# Patient Record
Sex: MALE | Race: WHITE | NOT HISPANIC OR LATINO | Employment: OTHER | URBAN - METROPOLITAN AREA
[De-identification: names, ages, dates, MRNs, and addresses within clinical notes are randomized per-mention and may not be internally consistent; named-entity substitution may affect disease eponyms.]

---

## 2023-03-29 LAB
ALBUMIN (G/DL) IN SER/PLAS: 4.2 G/DL (ref 3.4–5)
ANION GAP IN SER/PLAS: 12 MMOL/L (ref 10–20)
APPEARANCE, URINE: CLEAR
BILIRUBIN, URINE: NEGATIVE
BLOOD, URINE: ABNORMAL
CALCIUM (MG/DL) IN SER/PLAS: 9.4 MG/DL (ref 8.6–10.6)
CARBON DIOXIDE, TOTAL (MMOL/L) IN SER/PLAS: 29 MMOL/L (ref 21–32)
CHLORIDE (MMOL/L) IN SER/PLAS: 104 MMOL/L (ref 98–107)
COLOR, URINE: YELLOW
CREATININE (MG/DL) IN SER/PLAS: 0.87 MG/DL (ref 0.5–1.3)
GFR MALE: >90 ML/MIN/1.73M2
GLUCOSE (MG/DL) IN SER/PLAS: 93 MG/DL (ref 74–99)
GLUCOSE, URINE: NEGATIVE MG/DL
KETONES, URINE: NEGATIVE MG/DL
LEUKOCYTE ESTERASE, URINE: NEGATIVE
MUCUS, URINE: NORMAL /LPF
NITRITE, URINE: NEGATIVE
PH, URINE: 5 (ref 5–8)
PHOSPHATE (MG/DL) IN SER/PLAS: 3.1 MG/DL (ref 2.5–4.9)
POTASSIUM (MMOL/L) IN SER/PLAS: 4.8 MMOL/L (ref 3.5–5.3)
PROTEIN, URINE: NEGATIVE MG/DL
RBC, URINE: 2 /HPF (ref 0–5)
SODIUM (MMOL/L) IN SER/PLAS: 140 MMOL/L (ref 136–145)
SPECIFIC GRAVITY, URINE: 1.02 (ref 1–1.03)
UREA NITROGEN (MG/DL) IN SER/PLAS: 13 MG/DL (ref 6–23)
UROBILINOGEN, URINE: <2 MG/DL (ref 0–1.9)
WBC, URINE: 1 /HPF (ref 0–5)

## 2023-03-30 LAB — URINE CULTURE: NO GROWTH

## 2023-04-18 ENCOUNTER — HOSPITAL ENCOUNTER (OUTPATIENT)
Dept: DATA CONVERSION | Facility: HOSPITAL | Age: 73
End: 2023-04-18
Attending: OPHTHALMOLOGY | Admitting: OPHTHALMOLOGY
Payer: MEDICARE

## 2023-04-18 DIAGNOSIS — H35.372 PUCKERING OF MACULA, LEFT EYE: ICD-10-CM

## 2023-04-18 DIAGNOSIS — Z79.01 LONG TERM (CURRENT) USE OF ANTICOAGULANTS: ICD-10-CM

## 2023-04-18 DIAGNOSIS — I10 ESSENTIAL (PRIMARY) HYPERTENSION: ICD-10-CM

## 2023-05-18 ENCOUNTER — TELEPHONE (OUTPATIENT)
Dept: FAMILY MEDICINE | Age: 73
End: 2023-05-18

## 2023-09-07 VITALS
WEIGHT: 151.24 LBS | RESPIRATION RATE: 17 BRPM | BODY MASS INDEX: 21.65 KG/M2 | HEART RATE: 64 BPM | DIASTOLIC BLOOD PRESSURE: 73 MMHG | TEMPERATURE: 97.5 F | HEIGHT: 70 IN | SYSTOLIC BLOOD PRESSURE: 165 MMHG

## 2023-09-14 NOTE — H&P
History of Present Illness:   History Present Illness:  Reason for surgery: Epiretinal membrane left eye   HPI:    Patient presents for planned PPV/ILM/ICG for ERM, left eye    Allergies:        Allergies:  ·  No Known Allergies :     Home Medication Review:   Home Medications Reviewed: yes     Impression/Procedure:   ·  Impression and Planned Procedure: Patient presents for planned PPV/ILM/ICG for ERM, left eye       ERAS (Enhanced Recovery After Surgery):  ·  ERAS Patient: no       Vital Signs:  Temperature C: 36.4 degrees C   Temperature F: 97.5 degrees F   Heart Rate: 64 beats per minute   Respiratory Rate: 17 breath per minute   Blood Pressure Systolic: 165 mm/Hg   Blood Pressure Diastolic: 73 mm/Hg     Physical Exam by System:    Constitutional: Well appearing, no acute distress   Head/Neck: Normocephalic atraumatic   Respiratory/Thorax: No respiratory distress, equal  chest rise   Cardiovascular: Well perfused, regular rate   Gastrointestinal: Non distended   Neurological: Alert, oriented, interactive   Psychological: Normal affect   Skin: No gross rash or skin breakdown     Consent:   COVID-19 Consent:  ·  COVID-19 Risk Consent Surgeon has reviewed key risks related to the risk of berenice COVID-19 and if they contract COVID-19 what the risks are.     Attestation:   Note Completion:  I am a:  Resident/Fellow   Attending Attestation I saw and evaluated the patient.  I personally obtained the key and critical portions of the history and physical exam or was physically present for key and  critical portions performed by the resident/fellow. I reviewed the resident/fellow?s documentation and discussed the patient with the resident/fellow.  I agree with the resident/fellow?s medical decision making as documented in the note.     I personally evaluated the patient on 18-Apr-2023         Electronic Signatures:  Edmund Walker (Resident))  (Signed 18-Apr-2023 11:31)   Authored: History of Present Illness,  Allergies, Home  Medication Review, Impression/Procedure, ERAS, Physical Exam, Consent, Note Completion  Porter Conklin)  (Signed 18-Apr-2023 11:46)   Authored: Note Completion   Co-Signer: History of Present Illness, Allergies, Home Medication Review, Impression/Procedure, ERAS, Physical Exam, Consent, Note Completion      Last Updated: 18-Apr-2023 11:46 by Porter Conklin)

## 2023-10-02 NOTE — OP NOTE
Post Operative Note:     PreOp Diagnosis: ERM left eye   Post-Procedure Diagnosis: ERM left eye   Procedure: 1. Vitrectomy left eye  2. Membrane peel left eye   Surgeon: Porter Conklin   Resident/Fellow/Other Assistant: Perry Vital   Estimated Blood Loss (mL): none   Specimen: no   Findings: ERM left eye       Electronic Signatures:  Perry Vital)  (Signed 18-Apr-2023 14:23)   Authored: Post Operative Note, Note Completion      Last Updated: 18-Apr-2023 14:23 by Perry Vital)

## 2023-10-26 ENCOUNTER — OFFICE VISIT (OUTPATIENT)
Dept: PRIMARY CARE | Facility: CLINIC | Age: 73
End: 2023-10-26
Payer: MEDICARE

## 2023-10-26 VITALS — WEIGHT: 154 LBS | DIASTOLIC BLOOD PRESSURE: 78 MMHG | BODY MASS INDEX: 22.1 KG/M2 | SYSTOLIC BLOOD PRESSURE: 145 MMHG

## 2023-10-26 DIAGNOSIS — M79.18 ABDOMINAL MUSCLE PAIN: ICD-10-CM

## 2023-10-26 DIAGNOSIS — M17.10 PRIMARY OSTEOARTHRITIS OF KNEE, UNSPECIFIED LATERALITY: ICD-10-CM

## 2023-10-26 DIAGNOSIS — J30.89 ALLERGIC RHINITIS DUE TO OTHER ALLERGIC TRIGGER, UNSPECIFIED SEASONALITY: ICD-10-CM

## 2023-10-26 DIAGNOSIS — I10 PRIMARY HYPERTENSION: Primary | ICD-10-CM

## 2023-10-26 DIAGNOSIS — D68.9 CLOTTING DISORDER (MULTI): ICD-10-CM

## 2023-10-26 PROCEDURE — 99214 OFFICE O/P EST MOD 30 MIN: CPT | Performed by: INTERNAL MEDICINE

## 2023-10-26 PROCEDURE — 1126F AMNT PAIN NOTED NONE PRSNT: CPT | Performed by: INTERNAL MEDICINE

## 2023-10-26 PROCEDURE — 3077F SYST BP >= 140 MM HG: CPT | Performed by: INTERNAL MEDICINE

## 2023-10-26 PROCEDURE — 3078F DIAST BP <80 MM HG: CPT | Performed by: INTERNAL MEDICINE

## 2023-10-26 NOTE — PROGRESS NOTES
Subjective   Patient ID: Lele Hill is a 72 y.o. male who presents for No chief complaint on file..    HPI met patient for first time see updated front sheet no chest pain no shortness of breath constant running nose has briefly tried Flonase and antihistamine has seen ENT not been allergy tested complains of bilateral knee pain right greater than left had been suggested total knee replacement in the past he is trying to hold out by exercising and playing pickle ball he has factor V Leiden and prothrombin deficiency is on lifelong warfarin Hx himself daily complains of whether he has a hernia or not in the ventral area longstanding blood pressure treated with lisinopril bowels normal no dysuria other bones muscles joints okay    Past medical history hypertension DJD knee factor V prothrombin status post DVT x2    Medications warfarin lisinopril 40 mg    Allergies noted and unchanged    Social history no tobacco rare alcohol    Family history factor V    Prevention some exercise some prior blood work reviewed     Pression screen not depressed    vital signs noted alert and oriented x3 NCAT no coryza nares clear discharge OP benign TM normal bilateral EAC clear bilateral bilateral    Review of Systems    Objective   There were no vitals taken for this visit.    Physical Exam  Wearing aids no AC nodes no JVD chest clear to auscultation no wheezing no crackles CV regular rate and rhythm S1-S2 without murmur gallop or rub abdomen soft nontender normal active bowel sounds there is no ventral hernia has some oblique area discomfort with motion LS spine normal curvature nontender spinous process negative straight leg raise extremities no clubbing cyanosis or edema normal distal pulses DTR 2+  Assessment/Plan          impression hypertension allergic rhinitis or line and allergic rhinitis oblique strain clotting disorder knee DJD  Plan continue to check home monitoring advised on Coumadin in diet good water consumption  Tylenol as needed for the knee range of motion therapy knee brace offloading which she is using we will recheck in January 2024 for regular physical examination and blood work okay for Claritin trial 10 mg/day x 2 months he had only tried this for a week before may also consider Atrovent or Astepro in the future may follow-up with orthopedics as needed no heavy bending lifting or straining with respect to the abdominal area liniment cream if needed or heating pad recheck as above TT 50 cc 26

## 2023-10-28 ASSESSMENT — ENCOUNTER SYMPTOMS
LOSS OF SENSATION IN FEET: 0
DEPRESSION: 0
OCCASIONAL FEELINGS OF UNSTEADINESS: 0

## 2023-11-20 ENCOUNTER — APPOINTMENT (OUTPATIENT)
Dept: DERMATOLOGY | Facility: HOSPITAL | Age: 73
End: 2023-11-20
Payer: MEDICARE

## 2024-01-10 ENCOUNTER — OFFICE VISIT (OUTPATIENT)
Dept: PRIMARY CARE | Facility: CLINIC | Age: 74
End: 2024-01-10
Payer: MEDICARE

## 2024-01-10 VITALS — DIASTOLIC BLOOD PRESSURE: 73 MMHG | BODY MASS INDEX: 21.81 KG/M2 | SYSTOLIC BLOOD PRESSURE: 121 MMHG | WEIGHT: 152 LBS

## 2024-01-10 DIAGNOSIS — N40.0 BENIGN PROSTATIC HYPERPLASIA, UNSPECIFIED WHETHER LOWER URINARY TRACT SYMPTOMS PRESENT: ICD-10-CM

## 2024-01-10 DIAGNOSIS — I10 PRIMARY HYPERTENSION: ICD-10-CM

## 2024-01-10 DIAGNOSIS — Z00.00 HEALTH CARE MAINTENANCE: Primary | ICD-10-CM

## 2024-01-10 DIAGNOSIS — D68.9 CLOTTING DISORDER (MULTI): ICD-10-CM

## 2024-01-10 LAB
ALBUMIN SERPL BCP-MCNC: 4.1 G/DL (ref 3.4–5)
ALP SERPL-CCNC: 36 U/L (ref 33–136)
ALT SERPL W P-5'-P-CCNC: 12 U/L (ref 10–52)
ANION GAP SERPL CALC-SCNC: 12 MMOL/L (ref 10–20)
AST SERPL W P-5'-P-CCNC: 20 U/L (ref 9–39)
BILIRUB SERPL-MCNC: 1.2 MG/DL (ref 0–1.2)
BUN SERPL-MCNC: 11 MG/DL (ref 6–23)
CALCIUM SERPL-MCNC: 9.7 MG/DL (ref 8.6–10.6)
CHLORIDE SERPL-SCNC: 104 MMOL/L (ref 98–107)
CHOLEST SERPL-MCNC: 166 MG/DL (ref 0–199)
CHOLESTEROL/HDL RATIO: 2.7
CO2 SERPL-SCNC: 30 MMOL/L (ref 21–32)
CREAT SERPL-MCNC: 0.86 MG/DL (ref 0.5–1.3)
EGFRCR SERPLBLD CKD-EPI 2021: >90 ML/MIN/1.73M*2
ERYTHROCYTE [DISTWIDTH] IN BLOOD BY AUTOMATED COUNT: 12.8 % (ref 11.5–14.5)
GLUCOSE SERPL-MCNC: 88 MG/DL (ref 74–99)
HCT VFR BLD AUTO: 40.4 % (ref 41–52)
HDLC SERPL-MCNC: 61.7 MG/DL
HGB BLD-MCNC: 13 G/DL (ref 13.5–17.5)
LDLC SERPL CALC-MCNC: 86 MG/DL
MCH RBC QN AUTO: 29.1 PG (ref 26–34)
MCHC RBC AUTO-ENTMCNC: 32.2 G/DL (ref 32–36)
MCV RBC AUTO: 91 FL (ref 80–100)
NON HDL CHOLESTEROL: 104 MG/DL (ref 0–149)
NRBC BLD-RTO: 0 /100 WBCS (ref 0–0)
PLATELET # BLD AUTO: 197 X10*3/UL (ref 150–450)
POTASSIUM SERPL-SCNC: 4.4 MMOL/L (ref 3.5–5.3)
PROT SERPL-MCNC: 7.2 G/DL (ref 6.4–8.2)
PSA SERPL-MCNC: 1.18 NG/ML
RBC # BLD AUTO: 4.46 X10*6/UL (ref 4.5–5.9)
SODIUM SERPL-SCNC: 142 MMOL/L (ref 136–145)
TRIGL SERPL-MCNC: 92 MG/DL (ref 0–149)
VLDL: 18 MG/DL (ref 0–40)
WBC # BLD AUTO: 6.6 X10*3/UL (ref 4.4–11.3)

## 2024-01-10 PROCEDURE — 1159F MED LIST DOCD IN RCRD: CPT | Performed by: INTERNAL MEDICINE

## 2024-01-10 PROCEDURE — 1160F RVW MEDS BY RX/DR IN RCRD: CPT | Performed by: INTERNAL MEDICINE

## 2024-01-10 PROCEDURE — 84153 ASSAY OF PSA TOTAL: CPT

## 2024-01-10 PROCEDURE — 80053 COMPREHEN METABOLIC PANEL: CPT

## 2024-01-10 PROCEDURE — 1157F ADVNC CARE PLAN IN RCRD: CPT | Performed by: INTERNAL MEDICINE

## 2024-01-10 PROCEDURE — 1036F TOBACCO NON-USER: CPT | Performed by: INTERNAL MEDICINE

## 2024-01-10 PROCEDURE — 99213 OFFICE O/P EST LOW 20 MIN: CPT | Performed by: INTERNAL MEDICINE

## 2024-01-10 PROCEDURE — 3078F DIAST BP <80 MM HG: CPT | Performed by: INTERNAL MEDICINE

## 2024-01-10 PROCEDURE — G0439 PPPS, SUBSEQ VISIT: HCPCS | Performed by: INTERNAL MEDICINE

## 2024-01-10 PROCEDURE — 99397 PER PM REEVAL EST PAT 65+ YR: CPT | Performed by: INTERNAL MEDICINE

## 2024-01-10 PROCEDURE — 3074F SYST BP LT 130 MM HG: CPT | Performed by: INTERNAL MEDICINE

## 2024-01-10 PROCEDURE — 85027 COMPLETE CBC AUTOMATED: CPT

## 2024-01-10 PROCEDURE — 80061 LIPID PANEL: CPT

## 2024-01-10 PROCEDURE — 1170F FXNL STATUS ASSESSED: CPT | Performed by: INTERNAL MEDICINE

## 2024-01-10 PROCEDURE — 1126F AMNT PAIN NOTED NONE PRSNT: CPT | Performed by: INTERNAL MEDICINE

## 2024-01-10 PROCEDURE — 36415 COLL VENOUS BLD VENIPUNCTURE: CPT

## 2024-01-10 PROCEDURE — 93000 ELECTROCARDIOGRAM COMPLETE: CPT | Performed by: INTERNAL MEDICINE

## 2024-01-10 NOTE — PROGRESS NOTES
a[facility] Subjective   Patient ID: Lele Hill is a 73 y.o. male who presents for No chief complaint on file..    HPI CPE see updated front sheet no chest pain no shortness of breath no side effect with medication occasional low back stiffness bowels okay no dysuria other bones muscles joints okay    Past medical history hypertension allergic rhinitis BPH clotting disorder factor V melanoma status post DVT    Medications noted and unchanged including warfarin    Allergies noted and unchanged no known drug allergies    Social history no tobacco    Family history noted and unchanged    Prevention discussed with colonoscopy may or Cologuard some exercise some prior blood work reviewed    Depression screen not depressed    Review of Systems    Objective   There were no vitals taken for this visit.    Physical Exam vital signs noted alert and oriented x 3 NCAT PERRLA EOMI nares without discharge OP benign TM normal bilateral EAC clear bilateral no AC nodes no JVD or bruit no thyromegaly chest clear to auscultation CV regular rate and rhythm S1-S2 abdomen soft nontender normal active bowel sounds no rebound or guarding no HSM LS spine normal to straighten and curvature negative straight leg raise negative logroll negative SI joint tenderness extremities no clubbing cyanosis or edema trace nonpitting no cord normal distal pulses DTR 2+    Assessment/Plan impression General medical examination hypertension other diagnoses BPH clotting oa/back  Plan follow-up with dermatology follow-up in Coumadin clinic continue with the warfarin on the basis of perhaps forming clots check EKG advised on heart advised on blood pressure blood pressure medicine check Chem-7 advised on glucose potassium and kidney function back hygiene back stretches tylenol if anything else is needed   check hepatic panel advised on liver enzymes check lipid panel advised on cholesterol cholesterol medicine check CBC advised on blood count and colonoscopy  and follow-up on if having had or Cologuard continue with Cialis if helpful for the daily prostate recheck 3 months based on above TT 60 cc 31

## 2024-01-28 ASSESSMENT — ENCOUNTER SYMPTOMS
DEPRESSION: 0
OCCASIONAL FEELINGS OF UNSTEADINESS: 0
LOSS OF SENSATION IN FEET: 0

## 2024-01-28 ASSESSMENT — ACTIVITIES OF DAILY LIVING (ADL)
MANAGING_FINANCES: INDEPENDENT
DOING_HOUSEWORK: INDEPENDENT
TAKING_MEDICATION: INDEPENDENT
DRESSING: INDEPENDENT
GROCERY_SHOPPING: INDEPENDENT
BATHING: INDEPENDENT

## 2024-01-28 ASSESSMENT — PATIENT HEALTH QUESTIONNAIRE - PHQ9
2. FEELING DOWN, DEPRESSED OR HOPELESS: NOT AT ALL
SUM OF ALL RESPONSES TO PHQ9 QUESTIONS 1 AND 2: 0
1. LITTLE INTEREST OR PLEASURE IN DOING THINGS: NOT AT ALL

## 2024-02-09 ENCOUNTER — APPOINTMENT (OUTPATIENT)
Dept: UROLOGY | Facility: HOSPITAL | Age: 74
End: 2024-02-09
Payer: MEDICARE

## 2024-03-06 DIAGNOSIS — I10 PRIMARY HYPERTENSION: Primary | ICD-10-CM

## 2024-03-06 RX ORDER — LISINOPRIL 40 MG/1
40 TABLET ORAL DAILY
Qty: 90 TABLET | Refills: 0 | Status: SHIPPED | OUTPATIENT
Start: 2024-03-06

## 2024-03-25 ENCOUNTER — APPOINTMENT (OUTPATIENT)
Dept: DERMATOLOGY | Facility: HOSPITAL | Age: 74
End: 2024-03-25
Payer: MEDICARE

## 2024-04-19 NOTE — OP NOTE
DATE OF SURGERY: 4/18/23    PREOPERATIVE DIAGNOSIS  Epiretinal membrane left eye    POSTOPERATIVE DIAGNOSIS   Epiretinal membrane left eye    SURGEON: Porter Conklin MD    ASSISTANT: Perry Vital MD - a qualified  resident was not available, and the use of an assistant was necessary for crucial portions of the procedure, including the vitrectomy and membrane peeling portions of the procedure    OPERATION PERFORMED  25-gauge pars plana vitrectomy, membrane peel, left eye    ANESTHESIA  General     FINDINGS  As detailed below     COMPLICATIONS  None     ESTIMATED BLOOD LOSS   Minimal     SPECIMENS REMOVED  None     PROCEDURE:   The patient was brought to the preoperative holding area where the correct eye was confirmed and marked.  The patient was then brought to the operating room where a secondary time-out was performed to identify the correct patient, eye, procedure, and  any allergies. General anesthesia was induced. The eye was prepped and draped in the usual sterile ophthalmic fashion followed by placement of a lid speculum.     A 25-gauge trocar was placed in the inferotemporal quadrant 3.5 mm posterior to the limbus after conjunctival displacement.  A 4 mm infusion cannula was placed through this trocar, and the infusion cannula was confirmed in the vitreous cavity prior to  turning it on. Two additional 25-gauge trocars were placed in a similar fashion in the superonasal and superotemporal quadrants 3.5 mm posterior to the limbus after conjunctival displacement.     At this time, a standard three-port pars plana vitrectomy was performed using the light pipe, the cutter, and the BIOM viewing system. A core vitreous  dissection was performed. The retina was inspected and was found to be attached. A posterior vitreous detachment was performed with aspiration of  the hyaloid over the optic nerve after injection of diluted intravitreal triamcinolone.  A thorough peripheral vitreous dissection was performed.  The  ILM and ERM were peeled off the surface  of the macula in the posterior pole with ILM forceps, after injection of diluted Kenalog and  ICG for improved visualization.    The peripheral retina was inspected with scleral depression and was found to be attached without any retinal holes, breaks or fluid. The superonasal  and superotemporal trocars were removed and were found to be watertight. The infusion cannula and associated trocar were then removed and found to  be watertight. The eye was confirmed to be at a physiologic level by digital palpation after removal of the trocars.     The lid speculum and drapes were removed. Antibiotic ointment was applied. The eye was patched and shielded. The patient tolerated the procedure well without any intraoperative or immediate postoperative complications. The patient was taken to the recovery  room in good condition. The patient will follow up in clinic on the following morning.       Electronic Signatures:  Porter Conklin) (Signed on 26-Apr-2023 16:34)   Co-Signer  Perry Vital) (Signed on 18-Apr-2023 14:27)   Authored    Last Updated: 26-Apr-2023 16:34 by Porter Conklin)

## 2024-04-23 DIAGNOSIS — F41.9 ANXIETY: Primary | ICD-10-CM

## 2024-04-23 RX ORDER — FLUOXETINE HYDROCHLORIDE 20 MG/1
CAPSULE ORAL
COMMUNITY
Start: 2017-11-01 | End: 2024-04-23 | Stop reason: SDUPTHER

## 2024-04-23 RX ORDER — FLUOXETINE HYDROCHLORIDE 20 MG/1
20 CAPSULE ORAL DAILY
Qty: 30 CAPSULE | Refills: 1 | Status: SHIPPED | OUTPATIENT
Start: 2024-04-23

## 2024-04-29 ENCOUNTER — APPOINTMENT (OUTPATIENT)
Dept: DERMATOLOGY | Facility: HOSPITAL | Age: 74
End: 2024-04-29
Payer: MEDICARE

## 2024-05-22 ENCOUNTER — OFFICE VISIT (OUTPATIENT)
Dept: DERMATOLOGY | Facility: CLINIC | Age: 74
End: 2024-05-22
Payer: MEDICARE

## 2024-05-22 DIAGNOSIS — D22.9 MELANOCYTIC NEVUS, UNSPECIFIED LOCATION: ICD-10-CM

## 2024-05-22 DIAGNOSIS — L82.1 SEBORRHEIC KERATOSIS: ICD-10-CM

## 2024-05-22 DIAGNOSIS — D18.01 HEMANGIOMA OF SKIN: ICD-10-CM

## 2024-05-22 DIAGNOSIS — L57.8 OTHER SKIN CHANGES DUE TO CHRONIC EXPOSURE TO NONIONIZING RADIATION: Primary | ICD-10-CM

## 2024-05-22 DIAGNOSIS — Z85.828 PERSONAL HISTORY OF SKIN CANCER: ICD-10-CM

## 2024-05-22 DIAGNOSIS — L57.0 ACTINIC KERATOSES: ICD-10-CM

## 2024-05-22 DIAGNOSIS — L81.4 LENTIGO: ICD-10-CM

## 2024-05-22 DIAGNOSIS — Z85.820 PERSONAL HISTORY OF MALIGNANT MELANOMA OF SKIN: ICD-10-CM

## 2024-05-22 PROCEDURE — 17000 DESTRUCT PREMALG LESION: CPT | Performed by: STUDENT IN AN ORGANIZED HEALTH CARE EDUCATION/TRAINING PROGRAM

## 2024-05-22 PROCEDURE — 99213 OFFICE O/P EST LOW 20 MIN: CPT | Performed by: STUDENT IN AN ORGANIZED HEALTH CARE EDUCATION/TRAINING PROGRAM

## 2024-05-22 PROCEDURE — 1160F RVW MEDS BY RX/DR IN RCRD: CPT | Performed by: STUDENT IN AN ORGANIZED HEALTH CARE EDUCATION/TRAINING PROGRAM

## 2024-05-22 PROCEDURE — 1157F ADVNC CARE PLAN IN RCRD: CPT | Performed by: STUDENT IN AN ORGANIZED HEALTH CARE EDUCATION/TRAINING PROGRAM

## 2024-05-22 PROCEDURE — 1159F MED LIST DOCD IN RCRD: CPT | Performed by: STUDENT IN AN ORGANIZED HEALTH CARE EDUCATION/TRAINING PROGRAM

## 2024-05-22 PROCEDURE — 17003 DESTRUCT PREMALG LES 2-14: CPT | Performed by: STUDENT IN AN ORGANIZED HEALTH CARE EDUCATION/TRAINING PROGRAM

## 2024-05-22 NOTE — PROGRESS NOTES
Subjective     Lele Hill is a 73 y.o. male who presents for the following: Skin Check (FBSE. History of skin cancer (lower left leg, Behind left ear).).     Review of Systems:  No other skin or systemic complaints other than what is documented elsewhere in the note.    The following portions of the chart were reviewed this encounter and updated as appropriate:         Skin Cancer History  Melanoma left leg 2008  BCC left ear 2017 s/p Mohs      Specialty Problems    None       Objective   Well appearing patient in no apparent distress; mood and affect are within normal limits.    A full examination was performed including scalp, head, eyes, ears, nose, lips, neck, chest, axillae, abdomen, back, buttocks, bilateral upper extremities, bilateral lower extremities, hands, feet, fingers, toes, fingernails, and toenails. All findings within normal limits unless otherwise noted below.    Assessment/Plan   1. Other skin changes due to chronic exposure to nonionizing radiation  Mottled pigmentation, telangiectasias and brown reticular macules in sun exposed areas on the face, extremities, trunk    The risk of chronic, cumulative sun damage and risk of development of skin cancer was reviewed with the patient today. Discussed important of sun protection with sun protective clothing and/or broad spectrum sunscreen spf 30 or above.  Warning signs of skin cancer were reviewed. Patient to contact office should they notice any new or changing pre-existing skin lesion.    Related Procedures  Follow Up In Dermatology - Established Patient    2. Actinic keratoses (4)  Left Forehead, Mid Forehead, Right Ear, Right Forehead  Erythematous gritty macule(s)    Lesions are due to chronic, cumulative sun damage over time and are pre-cancerous. They have a risk of developing into squamous cell carcinoma and therefore treatment recommendations were offered and discussed with the patient. Discussed LN2 & topical chemotherapy creams, risks  and benefits of each. The risks and benefits of LN2 were reviewed including incomplete removal, crusting, blister hypo and/or hyperpigmentation, scarring. The patient elected for LN2 today.    Destr of lesion - Left Forehead, Mid Forehead, Right Ear, Right Forehead  Complexity: simple    Destruction method: cryotherapy    Informed consent: discussed and consent obtained    Lesion destroyed using liquid nitrogen: Yes    Cryotherapy cycles:  1  Outcome: patient tolerated procedure well with no complications    Post-procedure details: wound care instructions given      3. Personal history of skin cancer  Scar at site of prior procedure    There is no evidence of recurrence on clinical examination today, reassurance was provided to the patient. Discussed that hx of skin cancer increases risk of developing future skin cancer and total body skin exams are warranted every 6 months    4. Melanocytic nevus, unspecified location  Benign to slightly atypical appearing brown pigmented macules and papules    Clinically benign appearing nevi, reassurance provided to the patient today. Discussed important of sun protection with sun protective clothing and/or broad spectrum sunscreen spf 30 or above.  ABCDEs of melanoma reviewed. Patient to f/u should they notice any new or changing pre-existing skin lesion.    5. Seborrheic keratosis  Stuck on verrucous, tan-brown papules and plaques.      The benign nature of these skin lesions were reviewed with the patient today and reassurance was provided. Patient advised to return for f/u if the lesions change in size, shape, color, become painful, tender, itch or bleed.    6. Hemangioma of skin  Bright red papules    Discussed benign nature of condition, reassured. Reviewed warning signs of skin cancer with patient.    7. Lentigo  Scattered tan macules in sun-exposed areas.    Benign nature of these skin lesions reviewed and relation to sun exposure discussed. Reassurance provided. Reviewed  warning signs of skin cancer.    8. Personal history of malignant melanoma of skin  Scar(s) with no evidence of recurrence.    Lymph Nodes  Lymph Nodes-Clinical Exam:       There is no evidence of recurrence on clinical examination today, reassurance was provided to the patient.

## 2024-06-19 DIAGNOSIS — I10 PRIMARY HYPERTENSION: ICD-10-CM

## 2024-06-19 RX ORDER — LISINOPRIL 40 MG/1
40 TABLET ORAL DAILY
Qty: 90 TABLET | Refills: 0 | Status: SHIPPED | OUTPATIENT
Start: 2024-06-19

## 2024-06-19 RX ORDER — WARFARIN 4 MG/1
4 TABLET ORAL
Qty: 90 TABLET | Refills: 0 | Status: SHIPPED | OUTPATIENT
Start: 2024-06-19

## 2024-09-13 ENCOUNTER — TELEPHONE (OUTPATIENT)
Dept: PRIMARY CARE | Facility: CLINIC | Age: 74
End: 2024-09-13

## 2024-09-13 ENCOUNTER — APPOINTMENT (OUTPATIENT)
Dept: OPHTHALMOLOGY | Facility: CLINIC | Age: 74
End: 2024-09-13
Payer: MEDICARE

## 2024-09-13 DIAGNOSIS — I10 PRIMARY HYPERTENSION: ICD-10-CM

## 2024-09-14 RX ORDER — LISINOPRIL 40 MG/1
40 TABLET ORAL DAILY
Qty: 90 TABLET | Refills: 0 | Status: SHIPPED | OUTPATIENT
Start: 2024-09-14 | End: 2024-09-18 | Stop reason: SDUPTHER

## 2024-09-18 ENCOUNTER — APPOINTMENT (OUTPATIENT)
Dept: OPHTHALMOLOGY | Facility: CLINIC | Age: 74
End: 2024-09-18
Payer: MEDICARE

## 2024-09-18 DIAGNOSIS — H35.352 CYSTOID MACULAR EDEMA OF LEFT EYE: ICD-10-CM

## 2024-09-18 DIAGNOSIS — H35.372 EPIRETINAL MEMBRANE (ERM) OF LEFT EYE: Primary | ICD-10-CM

## 2024-09-18 PROCEDURE — 99213 OFFICE O/P EST LOW 20 MIN: CPT | Performed by: OPHTHALMOLOGY

## 2024-09-18 PROCEDURE — 92134 CPTRZ OPH DX IMG PST SGM RTA: CPT | Performed by: OPHTHALMOLOGY

## 2024-09-18 ASSESSMENT — TONOMETRY
OD_IOP_MMHG: 14
IOP_METHOD: GOLDMANN APPLANATION
OS_IOP_MMHG: 12

## 2024-09-18 ASSESSMENT — EXTERNAL EXAM - LEFT EYE: OS_EXAM: NORMAL

## 2024-09-18 ASSESSMENT — ENCOUNTER SYMPTOMS
ALLERGIC/IMMUNOLOGIC NEGATIVE: 0
EYES NEGATIVE: 0
CONSTITUTIONAL NEGATIVE: 0
HEMATOLOGIC/LYMPHATIC NEGATIVE: 0
PSYCHIATRIC NEGATIVE: 0
MUSCULOSKELETAL NEGATIVE: 0
RESPIRATORY NEGATIVE: 0
GASTROINTESTINAL NEGATIVE: 0
NEUROLOGICAL NEGATIVE: 0
CARDIOVASCULAR NEGATIVE: 0
ENDOCRINE NEGATIVE: 0

## 2024-09-18 ASSESSMENT — CUP TO DISC RATIO
OD_RATIO: 0.7
OS_RATIO: .3

## 2024-09-18 ASSESSMENT — VISUAL ACUITY
OD_CC: 20/25
OD_CC+: -1
OS_CC: 20/25
CORRECTION_TYPE: GLASSES
METHOD: SNELLEN - LINEAR

## 2024-09-18 ASSESSMENT — CONF VISUAL FIELD
OS_INFERIOR_NASAL_RESTRICTION: 0
OS_NORMAL: 1
OD_SUPERIOR_TEMPORAL_RESTRICTION: 0
OS_SUPERIOR_TEMPORAL_RESTRICTION: 0
OD_INFERIOR_TEMPORAL_RESTRICTION: 0
OD_NORMAL: 1
OS_SUPERIOR_NASAL_RESTRICTION: 0
OS_INFERIOR_TEMPORAL_RESTRICTION: 0
OD_INFERIOR_NASAL_RESTRICTION: 0
OD_SUPERIOR_NASAL_RESTRICTION: 0

## 2024-09-18 ASSESSMENT — SLIT LAMP EXAM - LIDS: COMMENTS: GOOD POSITION

## 2024-09-18 ASSESSMENT — EXTERNAL EXAM - RIGHT EYE: OD_EXAM: NORMAL

## 2024-09-18 ASSESSMENT — PACHYMETRY
OD_CT(UM): 591
OS_CT(UM): 633

## 2024-09-19 RX ORDER — LISINOPRIL 40 MG/1
40 TABLET ORAL DAILY
Qty: 90 TABLET | Refills: 0 | Status: SHIPPED | OUTPATIENT
Start: 2024-09-19

## 2024-09-19 RX ORDER — WARFARIN 4 MG/1
TABLET ORAL
Qty: 90 TABLET | Refills: 0 | Status: SHIPPED | OUTPATIENT
Start: 2024-09-19

## 2024-11-20 ENCOUNTER — APPOINTMENT (OUTPATIENT)
Dept: DERMATOLOGY | Facility: CLINIC | Age: 74
End: 2024-11-20
Payer: MEDICARE

## 2024-11-20 DIAGNOSIS — Z85.820 PERSONAL HISTORY OF MALIGNANT MELANOMA OF SKIN: ICD-10-CM

## 2024-11-20 DIAGNOSIS — L82.1 SEBORRHEIC KERATOSIS: ICD-10-CM

## 2024-11-20 DIAGNOSIS — D22.9 MELANOCYTIC NEVUS, UNSPECIFIED LOCATION: ICD-10-CM

## 2024-11-20 DIAGNOSIS — L57.8 OTHER SKIN CHANGES DUE TO CHRONIC EXPOSURE TO NONIONIZING RADIATION: ICD-10-CM

## 2024-11-20 DIAGNOSIS — L57.0 ACTINIC KERATOSES: ICD-10-CM

## 2024-11-20 DIAGNOSIS — D18.01 HEMANGIOMA OF SKIN: ICD-10-CM

## 2024-11-20 DIAGNOSIS — Z85.828 PERSONAL HISTORY OF SKIN CANCER: ICD-10-CM

## 2024-11-20 DIAGNOSIS — D48.5 NEOPLASM OF UNCERTAIN BEHAVIOR OF SKIN: Primary | ICD-10-CM

## 2024-11-20 DIAGNOSIS — L21.9 SEBORRHEIC DERMATITIS: ICD-10-CM

## 2024-11-20 DIAGNOSIS — L81.4 LENTIGO: ICD-10-CM

## 2024-11-20 PROCEDURE — 17003 DESTRUCT PREMALG LES 2-14: CPT

## 2024-11-20 PROCEDURE — 1159F MED LIST DOCD IN RCRD: CPT | Performed by: STUDENT IN AN ORGANIZED HEALTH CARE EDUCATION/TRAINING PROGRAM

## 2024-11-20 PROCEDURE — 1157F ADVNC CARE PLAN IN RCRD: CPT | Performed by: STUDENT IN AN ORGANIZED HEALTH CARE EDUCATION/TRAINING PROGRAM

## 2024-11-20 PROCEDURE — 17000 DESTRUCT PREMALG LESION: CPT

## 2024-11-20 PROCEDURE — 99214 OFFICE O/P EST MOD 30 MIN: CPT | Performed by: STUDENT IN AN ORGANIZED HEALTH CARE EDUCATION/TRAINING PROGRAM

## 2024-11-20 PROCEDURE — 11102 TANGNTL BX SKIN SINGLE LES: CPT

## 2024-11-20 RX ORDER — HYDROCORTISONE 25 MG/G
CREAM TOPICAL NIGHTLY
Qty: 30 G | Refills: 3 | Status: SHIPPED | OUTPATIENT
Start: 2024-11-20

## 2024-11-20 RX ORDER — KETOCONAZOLE 20 MG/G
CREAM TOPICAL DAILY
Qty: 60 G | Refills: 11 | Status: SHIPPED | OUTPATIENT
Start: 2024-11-20

## 2024-11-20 RX ORDER — FLUOROURACIL 50 MG/G
CREAM TOPICAL 2 TIMES DAILY
Qty: 40 G | Refills: 1 | Status: SHIPPED | OUTPATIENT
Start: 2024-11-20

## 2024-11-20 NOTE — PROGRESS NOTES
Subjective     Lele Hill is a 74 y.o. male who presents for the following: Skin Check (6 month follow up FBSE, Hx of Melanoma left leg 2008, BCC left ear 2017 treated by Mohs surgery. /).     Review of Systems:  No other skin or systemic complaints other than what is documented elsewhere in the note.    The following portions of the chart were reviewed this encounter and updated as appropriate:         Skin Cancer History  No skin cancer on file.      Specialty Problems    None       Objective   Well appearing patient in no apparent distress; mood and affect are within normal limits.    A full examination was performed including scalp, head, eyes, ears, nose, lips, neck, chest, axillae, abdomen, back, buttocks, bilateral upper extremities, bilateral lower extremities, hands, feet, fingers, toes, fingernails, and toenails. All findings within normal limits unless otherwise noted below.    Assessment/Plan   1. Neoplasm of uncertain behavior of skin  Right Upper Back  5 x 6 mm brown macule          Lesion biopsy  Type of biopsy: tangential    Informed consent: discussed and consent obtained    Timeout: patient name, date of birth, surgical site, and procedure verified    Procedure prep:  Patient was prepped and draped  Anesthesia: the lesion was anesthetized in a standard fashion    Anesthetic:  1% lidocaine w/ epinephrine 1-100,000 local infiltration  Instrument used: DermaBlade    Hemostasis achieved with: aluminum chloride    Outcome: patient tolerated procedure well    Post-procedure details: sterile dressing applied and wound care instructions given    Dressing type: petrolatum and bandage      Staff Communication: Dermatology Local Anesthesia: 1 % Lidocaine / Epinephrine - Amount: 3 mL    Specimen 1 - Dermatopathology- DERM LAB  Differential Diagnosis: dysplastic nevus vs SK  Check Margins Yes/No?:    Comments:  hx of MIS  Dermpath Lab: Routine Histopathology (formalin-fixed tissue)    Concerning lesion  found on exam. DDX for lesion includes: dysplastic nevus vs SK. The need for biopsy to aid in diagnosis was discussed and recommended. Risks and benefits of biopsy were reviewed. See procedure note.    2. Actinic keratoses (10)  Left Forehead, Left Inferior Helix (2), Left Mid Helix, Left Posterior Neck, Mid Forehead, Right Forehead, Right Inferior Helix, Right Mid Helix, Right Superior Helix  Erythematous gritty macule(s)    Lesions are due to chronic, cumulative sun damage over time and are pre-cancerous. They have a risk of developing into squamous cell carcinoma and therefore treatment recommendations were offered and discussed with the patient. Discussed LN2 & topical chemotherapy creams, risks and benefits of each. The risks and benefits of LN2 were reviewed including incomplete removal, crusting, blister hypo and/or hyperpigmentation, scarring.   START 5-fluorouracil bid x 2 weeks to forehead and temple  The patient elected for LN2 today for other areas    Destr of lesion - Left Inferior Helix (2), Left Mid Helix, Left Posterior Neck, Right Inferior Helix, Right Mid Helix, Right Superior Helix  Complexity: simple    Destruction method: cryotherapy    Informed consent: discussed and consent obtained    Lesion destroyed using liquid nitrogen: Yes    Region frozen until ice ball extended beyond lesion: Yes    Cryotherapy cycles:  1  Outcome: patient tolerated procedure well with no complications    Post-procedure details: wound care instructions given      Related Medications  fluorouracil (Efudex) 5 % cream cream  Apply topically 2 times a day. Apply to the affected summer of the temple and forehead twice daily for a total of 2 weeks. Wash hands thoroughly after each application.    3. Other skin changes due to chronic exposure to nonionizing radiation  Mottled pigmentation, telangiectasias and brown reticular macules in sun exposed areas on the face, extremities, trunk    The risk of chronic, cumulative sun  damage and risk of development of skin cancer was reviewed with the patient today. Discussed important of sun protection with sun protective clothing and/or broad spectrum sunscreen spf 30 or above.  Warning signs of skin cancer were reviewed. Patient to contact office should they notice any new or changing pre-existing skin lesion.    Related Procedures  Follow Up In Dermatology - Established Patient    4. Personal history of malignant melanoma of skin  Scar(s) with no evidence of recurrence.    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     5. Personal history of skin cancer  Scar at site of prior procedure        There is no evidence of recurrence on clinical examination today, reassurance was provided to the patient. Discussed that hx of skin cancer increases risk of developing future skin cancer and total body skin exams are warranted every 6 months    6. Melanocytic nevus, unspecified location  Benign to slightly atypical appearing brown pigmented macules and papules    Clinically benign appearing nevi, reassurance provided to the patient today. Discussed important of sun protection with sun protective clothing and/or broad spectrum sunscreen spf 30 or above.  ABCDEs of melanoma reviewed. Patient to f/u should they notice any new or changing pre-existing skin lesion.    7. Seborrheic keratosis  Stuck on verrucous, tan-brown papules and plaques.      The benign nature of these skin lesions were reviewed with the patient today and reassurance was provided. Patient advised to return for f/u if the lesions change in size, shape, color, become painful, tender, itch or bleed.    8. Hemangioma of skin  Bright red papules    Discussed benign nature of condition, reassured. Reviewed warning signs of skin cancer with patient.    9. Lentigo  Scattered trevino  macules in sun-exposed areas.    Benign nature of these skin lesions reviewed and relation to sun exposure discussed. Reassurance provided. Reviewed warning signs of skin cancer.    10. Seborrheic dermatitis  Left Cavum  Erythema with overlying greasy scale.    Discussed the chronic and relapsing nature of the condition. Counseled on relation to normal yeast species on skin and body's immune reaction to it. Discussed that goal is control, not cure, of condition.     Start ketoconazole 2% cream daily.   Continue hydrocortisone 2.5% cream to ear. Discussed r/b of chronic steroid use including skin atrophy      ketoconazole (NIZOral) 2 % cream - Left Cavum  Apply topically once daily. Apply to ear nightly    hydrocortisone 2.5 % cream - Left Cavum  Apply topically once daily at bedtime.      RTC 6 months    Patient seen and discussed with Dr. Jorge,   Mi Carias MD MPH  PGY-2, Department of Dermatology      I saw and evaluated the patient. I personally obtained the key and critical portions of the history and physical exam or was physically present for key and critical portions performed by the resident. I reviewed the resident's documentation and discussed the patient with the resident. I agree with the resident's medical decision making as documented in the note.    Zelda Jorge MD

## 2024-11-22 LAB
LABORATORY COMMENT REPORT: NORMAL
PATH REPORT.FINAL DX SPEC: NORMAL
PATH REPORT.GROSS SPEC: NORMAL
PATH REPORT.MICROSCOPIC SPEC OTHER STN: NORMAL
PATH REPORT.RELEVANT HX SPEC: NORMAL
PATH REPORT.TOTAL CANCER: NORMAL

## 2024-11-26 NOTE — RESULT ENCOUNTER NOTE
"Patient saw Right Upper Back Shave Biopsy Results and \"Patient Communication\" on 11/22/2024 at 9:46 PM. Dr. Jorge noted the result as a benign Seborrheic Keratosis requiring no further treatment."

## 2024-12-12 DIAGNOSIS — I10 PRIMARY HYPERTENSION: ICD-10-CM

## 2024-12-13 RX ORDER — LISINOPRIL 40 MG/1
40 TABLET ORAL DAILY
Qty: 90 TABLET | Refills: 0 | Status: SHIPPED | OUTPATIENT
Start: 2024-12-13

## 2024-12-16 DIAGNOSIS — I10 PRIMARY HYPERTENSION: ICD-10-CM

## 2024-12-20 RX ORDER — WARFARIN 4 MG/1
TABLET ORAL
Qty: 30 TABLET | Refills: 0 | Status: SHIPPED | OUTPATIENT
Start: 2024-12-20 | End: 2025-01-24 | Stop reason: SDUPTHER

## 2025-01-20 ENCOUNTER — TELEPHONE (OUTPATIENT)
Dept: PRIMARY CARE | Facility: CLINIC | Age: 75
End: 2025-01-20

## 2025-01-24 ENCOUNTER — TELEPHONE (OUTPATIENT)
Dept: PRIMARY CARE | Facility: CLINIC | Age: 75
End: 2025-01-24

## 2025-01-24 DIAGNOSIS — I10 PRIMARY HYPERTENSION: ICD-10-CM

## 2025-01-24 RX ORDER — WARFARIN 2 MG/1
TABLET ORAL
Qty: 60 TABLET | Refills: 0 | Status: SHIPPED | OUTPATIENT
Start: 2025-01-24

## 2025-03-17 ENCOUNTER — APPOINTMENT (OUTPATIENT)
Dept: PRIMARY CARE | Facility: CLINIC | Age: 75
End: 2025-03-17
Payer: MEDICARE

## 2025-03-17 VITALS — BODY MASS INDEX: 22.81 KG/M2 | SYSTOLIC BLOOD PRESSURE: 116 MMHG | WEIGHT: 159 LBS | DIASTOLIC BLOOD PRESSURE: 71 MMHG

## 2025-03-17 DIAGNOSIS — N40.0 BENIGN PROSTATIC HYPERPLASIA WITHOUT LOWER URINARY TRACT SYMPTOMS: ICD-10-CM

## 2025-03-17 DIAGNOSIS — I10 PRIMARY HYPERTENSION: ICD-10-CM

## 2025-03-17 DIAGNOSIS — E78.5 BORDERLINE HYPERLIPIDEMIA: ICD-10-CM

## 2025-03-17 DIAGNOSIS — D68.9 CLOTTING DISORDER (MULTI): ICD-10-CM

## 2025-03-17 DIAGNOSIS — F41.9 ANXIETY: ICD-10-CM

## 2025-03-17 DIAGNOSIS — Z00.00 HEALTH CARE MAINTENANCE: Primary | ICD-10-CM

## 2025-03-17 PROCEDURE — G0439 PPPS, SUBSEQ VISIT: HCPCS | Performed by: INTERNAL MEDICINE

## 2025-03-17 PROCEDURE — 99397 PER PM REEVAL EST PAT 65+ YR: CPT | Performed by: INTERNAL MEDICINE

## 2025-03-17 PROCEDURE — 1157F ADVNC CARE PLAN IN RCRD: CPT | Performed by: INTERNAL MEDICINE

## 2025-03-17 PROCEDURE — 1170F FXNL STATUS ASSESSED: CPT | Performed by: INTERNAL MEDICINE

## 2025-03-17 PROCEDURE — 3074F SYST BP LT 130 MM HG: CPT | Performed by: INTERNAL MEDICINE

## 2025-03-17 PROCEDURE — 1159F MED LIST DOCD IN RCRD: CPT | Performed by: INTERNAL MEDICINE

## 2025-03-17 PROCEDURE — 1160F RVW MEDS BY RX/DR IN RCRD: CPT | Performed by: INTERNAL MEDICINE

## 2025-03-17 PROCEDURE — 99214 OFFICE O/P EST MOD 30 MIN: CPT | Performed by: INTERNAL MEDICINE

## 2025-03-17 PROCEDURE — 1036F TOBACCO NON-USER: CPT | Performed by: INTERNAL MEDICINE

## 2025-03-17 PROCEDURE — 3078F DIAST BP <80 MM HG: CPT | Performed by: INTERNAL MEDICINE

## 2025-03-17 PROCEDURE — 93000 ELECTROCARDIOGRAM COMPLETE: CPT | Performed by: INTERNAL MEDICINE

## 2025-03-17 NOTE — PROGRESS NOTES
Sanam Subjective   Patient ID: Lele Hill is a 74 y.o. male who presents for No chief complaint on file..    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    Physical Exam vital signs noted alert and oriented x 3 NCAT PERRLA EOMI nares without discharge OP benign TM normal bilateral EAC clear bilateral no AC nodes no JVD or bruit no thyromegaly chest clear to auscultation and percussion no wheezing no crackles CV regular rate and rhythm S1-S2 abdomen soft nontender normal active bowel sounds no rebound or guarding no HSM LS spine normal to straighten and curvature negative straight leg raise negative logroll negative SI joint tenderness extremities no clubbing cyanosis or edema normal distal pulses DTR 2+    Assessment/Plan impression General medical examination hypertension other diagnoses BPH clotting factor v anxiety borderline hyperlipidemia  Plan check EKG advised on heart advised on blood pressure blood pressure medicine check comprehensive Valbazen glucose potassium and kidney function as well as liver function  Plan follow-up for regular skin exam protection from the sun discussed with the warfarin testing any tests at home watching the diet good water consumption  regular exercise plays pickle ball and runs advised on next colonoscopy or Cologuard check CBC advised on blood count check PSA advised on prostate no medication needed at the current time check lipid panel advised on cholesterol profile back hygiene back stretches continue with other medications and recheck 6 months based on above TT 60 cc 31

## 2025-03-18 LAB
ALBUMIN SERPL-MCNC: 4.5 G/DL (ref 3.6–5.1)
ALP SERPL-CCNC: 39 U/L (ref 35–144)
ALT SERPL-CCNC: 17 U/L (ref 9–46)
ANION GAP SERPL CALCULATED.4IONS-SCNC: 7 MMOL/L (CALC) (ref 7–17)
AST SERPL-CCNC: 22 U/L (ref 10–35)
BILIRUB SERPL-MCNC: 0.8 MG/DL (ref 0.2–1.2)
BUN SERPL-MCNC: 16 MG/DL (ref 7–25)
CALCIUM SERPL-MCNC: 9.5 MG/DL (ref 8.6–10.3)
CHLORIDE SERPL-SCNC: 102 MMOL/L (ref 98–110)
CHOLEST SERPL-MCNC: 174 MG/DL
CHOLEST/HDLC SERPL: 2.9 (CALC)
CO2 SERPL-SCNC: 30 MMOL/L (ref 20–32)
CREAT SERPL-MCNC: 0.86 MG/DL (ref 0.7–1.28)
EGFRCR SERPLBLD CKD-EPI 2021: 90 ML/MIN/1.73M2
ERYTHROCYTE [DISTWIDTH] IN BLOOD BY AUTOMATED COUNT: 12.1 % (ref 11–15)
GLUCOSE SERPL-MCNC: 102 MG/DL (ref 65–99)
HCT VFR BLD AUTO: 39.6 % (ref 38.5–50)
HDLC SERPL-MCNC: 59 MG/DL
HGB BLD-MCNC: 13.2 G/DL (ref 13.2–17.1)
LDLC SERPL CALC-MCNC: 88 MG/DL (CALC)
MCH RBC QN AUTO: 30.3 PG (ref 27–33)
MCHC RBC AUTO-ENTMCNC: 33.3 G/DL (ref 32–36)
MCV RBC AUTO: 90.8 FL (ref 80–100)
NONHDLC SERPL-MCNC: 115 MG/DL (CALC)
PLATELET # BLD AUTO: 185 THOUSAND/UL (ref 140–400)
PMV BLD REES-ECKER: 12.8 FL (ref 7.5–12.5)
POTASSIUM SERPL-SCNC: 4.3 MMOL/L (ref 3.5–5.3)
PROT SERPL-MCNC: 7.5 G/DL (ref 6.1–8.1)
PSA SERPL-MCNC: 1.19 NG/ML
RBC # BLD AUTO: 4.36 MILLION/UL (ref 4.2–5.8)
SODIUM SERPL-SCNC: 139 MMOL/L (ref 135–146)
TRIGL SERPL-MCNC: 169 MG/DL
WBC # BLD AUTO: 8 THOUSAND/UL (ref 3.8–10.8)

## 2025-03-30 ASSESSMENT — ACTIVITIES OF DAILY LIVING (ADL)
MANAGING_FINANCES: INDEPENDENT
GROCERY_SHOPPING: INDEPENDENT
DRESSING: INDEPENDENT
DOING_HOUSEWORK: INDEPENDENT
TAKING_MEDICATION: INDEPENDENT
BATHING: INDEPENDENT

## 2025-03-30 ASSESSMENT — PATIENT HEALTH QUESTIONNAIRE - PHQ9
2. FEELING DOWN, DEPRESSED OR HOPELESS: NOT AT ALL
1. LITTLE INTEREST OR PLEASURE IN DOING THINGS: NOT AT ALL
SUM OF ALL RESPONSES TO PHQ9 QUESTIONS 1 AND 2: 0

## 2025-03-30 ASSESSMENT — ENCOUNTER SYMPTOMS
DEPRESSION: 0
LOSS OF SENSATION IN FEET: 0
OCCASIONAL FEELINGS OF UNSTEADINESS: 0

## 2025-04-04 ENCOUNTER — OFFICE VISIT (OUTPATIENT)
Dept: OPHTHALMOLOGY | Facility: CLINIC | Age: 75
End: 2025-04-04
Payer: MEDICARE

## 2025-04-04 DIAGNOSIS — H35.352 CYSTOID MACULAR EDEMA OF LEFT EYE: ICD-10-CM

## 2025-04-04 DIAGNOSIS — H35.372 EPIRETINAL MEMBRANE (ERM) OF LEFT EYE: Primary | ICD-10-CM

## 2025-04-04 DIAGNOSIS — H35.371 EPIRETINAL MEMBRANE, RIGHT EYE: ICD-10-CM

## 2025-04-04 DIAGNOSIS — H26.491 RIGHT POSTERIOR CAPSULAR OPACIFICATION: ICD-10-CM

## 2025-04-04 PROCEDURE — 92134 CPTRZ OPH DX IMG PST SGM RTA: CPT | Performed by: OPHTHALMOLOGY

## 2025-04-04 PROCEDURE — 99214 OFFICE O/P EST MOD 30 MIN: CPT | Performed by: OPHTHALMOLOGY

## 2025-04-04 RX ORDER — ACETAMINOPHEN 500 MG
1 TABLET ORAL DAILY
COMMUNITY
Start: 2018-03-22

## 2025-04-04 ASSESSMENT — ENCOUNTER SYMPTOMS
CONSTITUTIONAL NEGATIVE: 1
EYES NEGATIVE: 1
MUSCULOSKELETAL NEGATIVE: 0
ALLERGIC/IMMUNOLOGIC NEGATIVE: 0
PSYCHIATRIC NEGATIVE: 0
GASTROINTESTINAL NEGATIVE: 0
RESPIRATORY NEGATIVE: 0
ENDOCRINE NEGATIVE: 0
NEUROLOGICAL NEGATIVE: 0
CARDIOVASCULAR NEGATIVE: 0
HEMATOLOGIC/LYMPHATIC NEGATIVE: 1

## 2025-04-04 ASSESSMENT — CONF VISUAL FIELD
OD_NORMAL: 1
OS_INFERIOR_NASAL_RESTRICTION: 0
OS_SUPERIOR_NASAL_RESTRICTION: 0
OS_INFERIOR_TEMPORAL_RESTRICTION: 0
OS_SUPERIOR_TEMPORAL_RESTRICTION: 0
OD_SUPERIOR_TEMPORAL_RESTRICTION: 0
OS_NORMAL: 1
OD_SUPERIOR_NASAL_RESTRICTION: 0
OD_INFERIOR_TEMPORAL_RESTRICTION: 0
OD_INFERIOR_NASAL_RESTRICTION: 0

## 2025-04-04 ASSESSMENT — TONOMETRY
OS_IOP_MMHG: 15
OD_IOP_MMHG: 16
IOP_METHOD: TONOPEN

## 2025-04-04 ASSESSMENT — VISUAL ACUITY
OS_CC: 20/
METHOD: SNELLEN - LINEAR
CORRECTION_TYPE: GLASSES
OD_CC: 20/25

## 2025-04-04 ASSESSMENT — SLIT LAMP EXAM - LIDS: COMMENTS: GOOD POSITION

## 2025-04-04 ASSESSMENT — CUP TO DISC RATIO
OD_RATIO: 0.7
OS_RATIO: .3

## 2025-04-04 ASSESSMENT — PACHYMETRY
OS_CT(UM): 633
OD_CT(UM): 591

## 2025-04-04 ASSESSMENT — EXTERNAL EXAM - RIGHT EYE: OD_EXAM: NORMAL

## 2025-04-04 ASSESSMENT — EXTERNAL EXAM - LEFT EYE: OS_EXAM: NORMAL

## 2025-04-04 NOTE — PROGRESS NOTES
Assessment/Plan   Diagnoses and all orders for this visit:  Epiretinal membrane (ERM) of left eye  -     OCT, Retina - OU - Both Eyes  Cystoid macular edema of left eye  Epiretinal membrane, right eye  Right posterior capsular opacification       Cystoid macular edema (CME) OS resolved                                    Discussion      Epiretinal Membrane, OS mnth 4 PPV MP OS good results   CME OS resolved   Epiretinal membrane (ERM) OD watch    PCO OD see Dr Neal THI IS NEW                  Hi quality OCT  scans obtained   signal good      OCT OD - Normal Foveal Contour,epiretinal membrane (ERM)  No Edema, IS/OS Junction Normal   OCT OS - Normal Foveal Contour, No Edema, IS/OS Junction Normal        See Dr Neal for PCO evaluation OD

## 2025-04-16 DIAGNOSIS — I10 PRIMARY HYPERTENSION: ICD-10-CM

## 2025-04-17 RX ORDER — WARFARIN 2 MG/1
TABLET ORAL
Qty: 60 TABLET | Refills: 0 | Status: SHIPPED | OUTPATIENT
Start: 2025-04-17

## 2025-04-29 ENCOUNTER — APPOINTMENT (OUTPATIENT)
Dept: OPHTHALMOLOGY | Facility: CLINIC | Age: 75
End: 2025-04-29
Payer: MEDICARE

## 2025-04-29 DIAGNOSIS — H26.491 RIGHT POSTERIOR CAPSULAR OPACIFICATION: Primary | ICD-10-CM

## 2025-04-29 PROCEDURE — 66821 AFTER CATARACT LASER SURGERY: CPT | Mod: RIGHT SIDE | Performed by: OPHTHALMOLOGY

## 2025-04-29 ASSESSMENT — EXTERNAL EXAM - LEFT EYE: OS_EXAM: NORMAL

## 2025-04-29 ASSESSMENT — CONF VISUAL FIELD
OD_NORMAL: 1
OS_INFERIOR_NASAL_RESTRICTION: 0
OD_SUPERIOR_NASAL_RESTRICTION: 0
OD_INFERIOR_TEMPORAL_RESTRICTION: 0
OS_INFERIOR_TEMPORAL_RESTRICTION: 0
OS_SUPERIOR_NASAL_RESTRICTION: 0
OS_NORMAL: 1
OD_INFERIOR_NASAL_RESTRICTION: 0
OS_SUPERIOR_TEMPORAL_RESTRICTION: 0
OD_SUPERIOR_TEMPORAL_RESTRICTION: 0

## 2025-04-29 ASSESSMENT — PACHYMETRY
OD_CT(UM): 591
OS_CT(UM): 633

## 2025-04-29 ASSESSMENT — VISUAL ACUITY
METHOD: SNELLEN - LINEAR
OS_CC: 20/25
OD_CC: 20/40
CORRECTION_TYPE: GLASSES
OD_BAT_HIGH: 20/80

## 2025-04-29 ASSESSMENT — SLIT LAMP EXAM - LIDS: COMMENTS: GOOD POSITION

## 2025-04-29 ASSESSMENT — EXTERNAL EXAM - RIGHT EYE: OD_EXAM: NORMAL

## 2025-04-29 ASSESSMENT — TONOMETRY
IOP_METHOD: GOLDMANN APPLANATION
OD_IOP_MMHG: 10
OS_IOP_MMHG: 10

## 2025-04-29 ASSESSMENT — CUP TO DISC RATIO: OD_RATIO: 0.7

## 2025-04-29 ASSESSMENT — ENCOUNTER SYMPTOMS: EYES NEGATIVE: 1

## 2025-04-29 NOTE — PROGRESS NOTES
Assessment/Plan   Diagnoses and all orders for this visit:  Right posterior capsular opacification  PCO visually significant and interfers with pt daily activities such as driving and working on a computer  Offered YAG OD. Explained r/b/a of procedure. Patient would like to proceed.   -     YAG Capsulotomy - OD - Right Eye

## 2025-05-07 DIAGNOSIS — I10 PRIMARY HYPERTENSION: ICD-10-CM

## 2025-05-07 RX ORDER — WARFARIN 2 MG/1
TABLET ORAL
Qty: 180 TABLET | Refills: 1 | Status: SHIPPED | OUTPATIENT
Start: 2025-05-07

## 2025-05-21 ENCOUNTER — APPOINTMENT (OUTPATIENT)
Dept: DERMATOLOGY | Facility: CLINIC | Age: 75
End: 2025-05-21
Payer: MEDICARE

## 2025-05-21 DIAGNOSIS — D22.9 MELANOCYTIC NEVUS, UNSPECIFIED LOCATION: ICD-10-CM

## 2025-05-21 DIAGNOSIS — L57.0 ACTINIC KERATOSES: ICD-10-CM

## 2025-05-21 DIAGNOSIS — L81.4 LENTIGO: ICD-10-CM

## 2025-05-21 DIAGNOSIS — L57.8 OTHER SKIN CHANGES DUE TO CHRONIC EXPOSURE TO NONIONIZING RADIATION: ICD-10-CM

## 2025-05-21 DIAGNOSIS — Z85.828 PERSONAL HISTORY OF SKIN CANCER: Primary | ICD-10-CM

## 2025-05-21 DIAGNOSIS — L82.1 SEBORRHEIC KERATOSIS: ICD-10-CM

## 2025-05-21 DIAGNOSIS — Z85.820 PERSONAL HISTORY OF MALIGNANT MELANOMA OF SKIN: ICD-10-CM

## 2025-05-21 DIAGNOSIS — D18.01 HEMANGIOMA OF SKIN: ICD-10-CM

## 2025-06-09 DIAGNOSIS — I10 PRIMARY HYPERTENSION: ICD-10-CM

## 2025-06-12 RX ORDER — LISINOPRIL 40 MG/1
40 TABLET ORAL DAILY
Qty: 90 TABLET | Refills: 0 | Status: SHIPPED | OUTPATIENT
Start: 2025-06-12

## 2025-09-15 ENCOUNTER — APPOINTMENT (OUTPATIENT)
Dept: PRIMARY CARE | Facility: CLINIC | Age: 75
End: 2025-09-15
Payer: MEDICARE

## 2025-09-19 ENCOUNTER — APPOINTMENT (OUTPATIENT)
Dept: OPHTHALMOLOGY | Facility: CLINIC | Age: 75
End: 2025-09-19
Payer: MEDICARE

## 2025-11-19 ENCOUNTER — APPOINTMENT (OUTPATIENT)
Dept: DERMATOLOGY | Facility: CLINIC | Age: 75
End: 2025-11-19
Payer: MEDICARE

## 2026-04-10 ENCOUNTER — APPOINTMENT (OUTPATIENT)
Dept: OPHTHALMOLOGY | Facility: CLINIC | Age: 76
End: 2026-04-10
Payer: MEDICARE